# Patient Record
Sex: FEMALE | Race: NATIVE HAWAIIAN OR OTHER PACIFIC ISLANDER | NOT HISPANIC OR LATINO | URBAN - METROPOLITAN AREA
[De-identification: names, ages, dates, MRNs, and addresses within clinical notes are randomized per-mention and may not be internally consistent; named-entity substitution may affect disease eponyms.]

---

## 2017-04-17 ENCOUNTER — INPATIENT (INPATIENT)
Facility: HOSPITAL | Age: 32
LOS: 2 days | Discharge: HOME | End: 2017-04-20
Attending: OBSTETRICS & GYNECOLOGY | Admitting: OBSTETRICS & GYNECOLOGY
Payer: COMMERCIAL

## 2017-04-17 VITALS — HEIGHT: 64 IN | WEIGHT: 242.51 LBS

## 2017-04-17 LAB
BASOPHILS NFR BLD AUTO: 0.2 % — SIGNIFICANT CHANGE UP (ref 0–2)
BLD GP AB SCN SERPL QL: NEGATIVE — SIGNIFICANT CHANGE UP
EOSINOPHIL NFR BLD AUTO: 1.5 % — SIGNIFICANT CHANGE UP (ref 0–6)
HCT VFR BLD CALC: 39.7 % — SIGNIFICANT CHANGE UP (ref 34.5–45)
HGB BLD-MCNC: 13.5 G/DL — SIGNIFICANT CHANGE UP (ref 11.5–15.5)
LYMPHOCYTES # BLD AUTO: 15.8 % — SIGNIFICANT CHANGE UP (ref 13–44)
MCHC RBC-ENTMCNC: 30.5 PG — SIGNIFICANT CHANGE UP (ref 27–34)
MCHC RBC-ENTMCNC: 34 G/DL — SIGNIFICANT CHANGE UP (ref 32–36)
MCV RBC AUTO: 89.8 FL — SIGNIFICANT CHANGE UP (ref 80–100)
MONOCYTES NFR BLD AUTO: 8 % — SIGNIFICANT CHANGE UP (ref 2–14)
NEUTROPHILS NFR BLD AUTO: 74.5 % — SIGNIFICANT CHANGE UP (ref 43–77)
PLATELET # BLD AUTO: 203 K/UL — SIGNIFICANT CHANGE UP (ref 150–400)
RBC # BLD: 4.42 M/UL — SIGNIFICANT CHANGE UP (ref 3.8–5.2)
RBC # FLD: 13.2 % — SIGNIFICANT CHANGE UP (ref 10.3–16.9)
RH IG SCN BLD-IMP: POSITIVE — SIGNIFICANT CHANGE UP
RH IG SCN BLD-IMP: POSITIVE — SIGNIFICANT CHANGE UP
T PALLIDUM AB TITR SER: NEGATIVE — SIGNIFICANT CHANGE UP
WBC # BLD: 13 K/UL — HIGH (ref 3.8–10.5)
WBC # FLD AUTO: 13 K/UL — HIGH (ref 3.8–10.5)

## 2017-04-17 RX ORDER — FERROUS SULFATE 325(65) MG
325 TABLET ORAL DAILY
Qty: 0 | Refills: 0 | Status: DISCONTINUED | OUTPATIENT
Start: 2017-04-17 | End: 2017-04-20

## 2017-04-17 RX ORDER — SODIUM CHLORIDE 9 MG/ML
500 INJECTION, SOLUTION INTRAVENOUS ONCE
Qty: 0 | Refills: 0 | Status: DISCONTINUED | OUTPATIENT
Start: 2017-04-17 | End: 2017-04-20

## 2017-04-17 RX ORDER — SODIUM CHLORIDE 9 MG/ML
1000 INJECTION, SOLUTION INTRAVENOUS
Qty: 0 | Refills: 0 | Status: DISCONTINUED | OUTPATIENT
Start: 2017-04-17 | End: 2017-04-20

## 2017-04-17 RX ORDER — TETANUS TOXOID, REDUCED DIPHTHERIA TOXOID AND ACELLULAR PERTUSSIS VACCINE, ADSORBED 5; 2.5; 8; 8; 2.5 [IU]/.5ML; [IU]/.5ML; UG/.5ML; UG/.5ML; UG/.5ML
0.5 SUSPENSION INTRAMUSCULAR ONCE
Qty: 0 | Refills: 0 | Status: DISCONTINUED | OUTPATIENT
Start: 2017-04-17 | End: 2017-04-20

## 2017-04-17 RX ORDER — DIPHENHYDRAMINE HCL 50 MG
25 CAPSULE ORAL EVERY 6 HOURS
Qty: 0 | Refills: 0 | Status: DISCONTINUED | OUTPATIENT
Start: 2017-04-17 | End: 2017-04-20

## 2017-04-17 RX ORDER — OXYTOCIN 10 UNIT/ML
41.67 VIAL (ML) INJECTION
Qty: 20 | Refills: 0 | Status: DISCONTINUED | OUTPATIENT
Start: 2017-04-17 | End: 2017-04-20

## 2017-04-17 RX ORDER — HEPARIN SODIUM 5000 [USP'U]/ML
5000 INJECTION INTRAVENOUS; SUBCUTANEOUS EVERY 12 HOURS
Qty: 0 | Refills: 0 | Status: DISCONTINUED | OUTPATIENT
Start: 2017-04-18 | End: 2017-04-20

## 2017-04-17 RX ORDER — DOCUSATE SODIUM 100 MG
100 CAPSULE ORAL
Qty: 0 | Refills: 0 | Status: DISCONTINUED | OUTPATIENT
Start: 2017-04-17 | End: 2017-04-20

## 2017-04-17 RX ORDER — ONDANSETRON 8 MG/1
4 TABLET, FILM COATED ORAL EVERY 6 HOURS
Qty: 0 | Refills: 0 | Status: DISCONTINUED | OUTPATIENT
Start: 2017-04-17 | End: 2017-04-20

## 2017-04-17 RX ORDER — ACETAMINOPHEN 500 MG
650 TABLET ORAL EVERY 6 HOURS
Qty: 0 | Refills: 0 | Status: DISCONTINUED | OUTPATIENT
Start: 2017-04-17 | End: 2017-04-17

## 2017-04-17 RX ORDER — IBUPROFEN 200 MG
600 TABLET ORAL EVERY 6 HOURS
Qty: 0 | Refills: 0 | Status: DISCONTINUED | OUTPATIENT
Start: 2017-04-17 | End: 2017-04-20

## 2017-04-17 RX ORDER — IBUPROFEN 200 MG
600 TABLET ORAL EVERY 6 HOURS
Qty: 0 | Refills: 0 | Status: DISCONTINUED | OUTPATIENT
Start: 2017-04-17 | End: 2017-04-17

## 2017-04-17 RX ORDER — GLYCERIN ADULT
1 SUPPOSITORY, RECTAL RECTAL AT BEDTIME
Qty: 0 | Refills: 0 | Status: DISCONTINUED | OUTPATIENT
Start: 2017-04-17 | End: 2017-04-20

## 2017-04-17 RX ORDER — DIPHENHYDRAMINE HCL 50 MG
12.5 CAPSULE ORAL EVERY 4 HOURS
Qty: 0 | Refills: 0 | Status: DISCONTINUED | OUTPATIENT
Start: 2017-04-17 | End: 2017-04-20

## 2017-04-17 RX ORDER — IBUPROFEN 200 MG
600 TABLET ORAL ONCE
Qty: 0 | Refills: 0 | Status: DISCONTINUED | OUTPATIENT
Start: 2017-04-17 | End: 2017-04-20

## 2017-04-17 RX ORDER — METOCLOPRAMIDE HCL 10 MG
10 TABLET ORAL ONCE
Qty: 0 | Refills: 0 | Status: DISCONTINUED | OUTPATIENT
Start: 2017-04-17 | End: 2017-04-17

## 2017-04-17 RX ORDER — ACETAMINOPHEN 500 MG
650 TABLET ORAL EVERY 6 HOURS
Qty: 0 | Refills: 0 | Status: DISCONTINUED | OUTPATIENT
Start: 2017-04-17 | End: 2017-04-20

## 2017-04-17 RX ORDER — SODIUM CHLORIDE 9 MG/ML
1000 INJECTION, SOLUTION INTRAVENOUS
Qty: 0 | Refills: 0 | Status: DISCONTINUED | OUTPATIENT
Start: 2017-04-17 | End: 2017-04-17

## 2017-04-17 RX ORDER — CEFAZOLIN SODIUM 1 G
2000 VIAL (EA) INJECTION ONCE
Qty: 0 | Refills: 0 | Status: COMPLETED | OUTPATIENT
Start: 2017-04-17 | End: 2017-04-17

## 2017-04-17 RX ORDER — SODIUM CHLORIDE 9 MG/ML
1000 INJECTION, SOLUTION INTRAVENOUS ONCE
Qty: 0 | Refills: 0 | Status: COMPLETED | OUTPATIENT
Start: 2017-04-17 | End: 2017-04-17

## 2017-04-17 RX ORDER — NALOXONE HYDROCHLORIDE 4 MG/.1ML
0.1 SPRAY NASAL
Qty: 0 | Refills: 0 | Status: DISCONTINUED | OUTPATIENT
Start: 2017-04-17 | End: 2017-04-20

## 2017-04-17 RX ORDER — LANOLIN
1 OINTMENT (GRAM) TOPICAL
Qty: 0 | Refills: 0 | Status: DISCONTINUED | OUTPATIENT
Start: 2017-04-17 | End: 2017-04-20

## 2017-04-17 RX ORDER — OXYTOCIN 10 UNIT/ML
333.33 VIAL (ML) INJECTION
Qty: 20 | Refills: 0 | Status: DISCONTINUED | OUTPATIENT
Start: 2017-04-17 | End: 2017-04-20

## 2017-04-17 RX ORDER — CITRIC ACID/SODIUM CITRATE 300-500 MG
30 SOLUTION, ORAL ORAL ONCE
Qty: 0 | Refills: 0 | Status: COMPLETED | OUTPATIENT
Start: 2017-04-17 | End: 2017-04-17

## 2017-04-17 RX ORDER — SIMETHICONE 80 MG/1
80 TABLET, CHEWABLE ORAL EVERY 4 HOURS
Qty: 0 | Refills: 0 | Status: DISCONTINUED | OUTPATIENT
Start: 2017-04-17 | End: 2017-04-20

## 2017-04-17 RX ADMIN — Medication 125 MILLIUNIT(S)/MIN: at 11:24

## 2017-04-17 RX ADMIN — Medication 125 MILLIUNIT(S)/MIN: at 10:59

## 2017-04-17 RX ADMIN — SODIUM CHLORIDE 125 MILLILITER(S): 9 INJECTION, SOLUTION INTRAVENOUS at 08:50

## 2017-04-17 RX ADMIN — ONDANSETRON 4 MILLIGRAM(S): 8 TABLET, FILM COATED ORAL at 15:35

## 2017-04-17 RX ADMIN — Medication 100 MILLIGRAM(S): at 08:51

## 2017-04-17 RX ADMIN — Medication 1000 MILLIUNIT(S)/MIN: at 10:57

## 2017-04-17 RX ADMIN — SODIUM CHLORIDE 125 MILLILITER(S): 9 INJECTION, SOLUTION INTRAVENOUS at 06:45

## 2017-04-17 RX ADMIN — Medication 30 MILLILITER(S): at 08:50

## 2017-04-17 RX ADMIN — SODIUM CHLORIDE 2000 MILLILITER(S): 9 INJECTION, SOLUTION INTRAVENOUS at 06:05

## 2017-04-18 RX ADMIN — Medication 600 MILLIGRAM(S): at 06:30

## 2017-04-18 RX ADMIN — SODIUM CHLORIDE 125 MILLILITER(S): 9 INJECTION, SOLUTION INTRAVENOUS at 03:06

## 2017-04-18 RX ADMIN — Medication 600 MILLIGRAM(S): at 13:30

## 2017-04-18 RX ADMIN — Medication 600 MILLIGRAM(S): at 05:42

## 2017-04-18 RX ADMIN — HEPARIN SODIUM 5000 UNIT(S): 5000 INJECTION INTRAVENOUS; SUBCUTANEOUS at 05:42

## 2017-04-18 RX ADMIN — Medication 600 MILLIGRAM(S): at 12:30

## 2017-04-18 RX ADMIN — Medication 1 TABLET(S): at 12:31

## 2017-04-18 RX ADMIN — HEPARIN SODIUM 5000 UNIT(S): 5000 INJECTION INTRAVENOUS; SUBCUTANEOUS at 18:41

## 2017-04-18 RX ADMIN — Medication 600 MILLIGRAM(S): at 19:29

## 2017-04-18 RX ADMIN — Medication 325 MILLIGRAM(S): at 12:30

## 2017-04-18 RX ADMIN — Medication 600 MILLIGRAM(S): at 18:40

## 2017-04-18 NOTE — PROGRESS NOTE ADULT - ASSESSMENT
A/P32y s/p c/s, POD # 1 ,stable  - f/u postop cbc  Diet: Reg  Pain: OPM  IV fluid: Saline lock  OOB/SCDs/IS  Continue to monitor  Anticipate discharge POD #3 or #4

## 2017-04-19 LAB
HCT VFR BLD CALC: 32.8 % — LOW (ref 34.5–45)
HGB BLD-MCNC: 10.9 G/DL — LOW (ref 11.5–15.5)
MCHC RBC-ENTMCNC: 30.4 PG — SIGNIFICANT CHANGE UP (ref 27–34)
MCHC RBC-ENTMCNC: 33.2 G/DL — SIGNIFICANT CHANGE UP (ref 32–36)
MCV RBC AUTO: 91.6 FL — SIGNIFICANT CHANGE UP (ref 80–100)
PLATELET # BLD AUTO: 178 K/UL — SIGNIFICANT CHANGE UP (ref 150–400)
RBC # BLD: 3.58 M/UL — LOW (ref 3.8–5.2)
RBC # FLD: 13.6 % — SIGNIFICANT CHANGE UP (ref 10.3–16.9)
WBC # BLD: 13 K/UL — HIGH (ref 3.8–10.5)
WBC # FLD AUTO: 13 K/UL — HIGH (ref 3.8–10.5)

## 2017-04-19 RX ADMIN — Medication 600 MILLIGRAM(S): at 19:42

## 2017-04-19 RX ADMIN — Medication 100 MILLIGRAM(S): at 06:00

## 2017-04-19 RX ADMIN — Medication 600 MILLIGRAM(S): at 06:30

## 2017-04-19 RX ADMIN — Medication 325 MILLIGRAM(S): at 13:10

## 2017-04-19 RX ADMIN — Medication 600 MILLIGRAM(S): at 00:33

## 2017-04-19 RX ADMIN — Medication 600 MILLIGRAM(S): at 14:00

## 2017-04-19 RX ADMIN — HEPARIN SODIUM 5000 UNIT(S): 5000 INJECTION INTRAVENOUS; SUBCUTANEOUS at 18:57

## 2017-04-19 RX ADMIN — Medication 600 MILLIGRAM(S): at 06:00

## 2017-04-19 RX ADMIN — HEPARIN SODIUM 5000 UNIT(S): 5000 INJECTION INTRAVENOUS; SUBCUTANEOUS at 06:00

## 2017-04-19 RX ADMIN — Medication 600 MILLIGRAM(S): at 13:10

## 2017-04-19 RX ADMIN — Medication 1 TABLET(S): at 13:10

## 2017-04-19 RX ADMIN — Medication 600 MILLIGRAM(S): at 01:00

## 2017-04-19 RX ADMIN — Medication 600 MILLIGRAM(S): at 18:54

## 2017-04-19 NOTE — DISCHARGE NOTE OB - CARE PROVIDER_API CALL
Shirlene Lancaster), Obstetrics and Gynecology  [address] [place] [address]  Phone: [phone]  Fax: [phone]

## 2017-04-19 NOTE — PROGRESS NOTE ADULT - ASSESSMENT
A/P32y s/p c/s, POD # 2 ,stable  - f/u postop cbc  Diet: Reg  Pain: OPM  IV fluid: Saline lock  OOB/SCDs/IS  Continue to monitor  Anticipate discharge POD #3 or #4

## 2017-04-19 NOTE — DISCHARGE NOTE OB - CARE PLAN
Principal Discharge DX:	40 weeks gestation of pregnancy  Goal:	follow up 2 weeks  Instructions for follow-up, activity and diet:	follow up 2 weeks, pelvic rest 6 weeks

## 2017-04-19 NOTE — DISCHARGE NOTE OB - PATIENT PORTAL LINK FT
“You can access the [facility] Patient Portal, offered by [facility], by registering with the following website: [email or web address]”

## 2017-04-20 VITALS
TEMPERATURE: 98 F | OXYGEN SATURATION: 96 % | DIASTOLIC BLOOD PRESSURE: 73 MMHG | SYSTOLIC BLOOD PRESSURE: 114 MMHG | HEART RATE: 67 BPM | RESPIRATION RATE: 16 BRPM

## 2017-04-20 PROCEDURE — 36415 COLL VENOUS BLD VENIPUNCTURE: CPT

## 2017-04-20 PROCEDURE — 86901 BLOOD TYPING SEROLOGIC RH(D): CPT

## 2017-04-20 PROCEDURE — 85027 COMPLETE CBC AUTOMATED: CPT

## 2017-04-20 PROCEDURE — 85025 COMPLETE CBC W/AUTO DIFF WBC: CPT

## 2017-04-20 PROCEDURE — 86780 TREPONEMA PALLIDUM: CPT

## 2017-04-20 PROCEDURE — 86850 RBC ANTIBODY SCREEN: CPT

## 2017-04-20 PROCEDURE — C1765: CPT

## 2017-04-20 PROCEDURE — 86900 BLOOD TYPING SEROLOGIC ABO: CPT

## 2017-04-20 RX ADMIN — Medication 1 TABLET(S): at 13:04

## 2017-04-20 RX ADMIN — Medication 600 MILLIGRAM(S): at 02:20

## 2017-04-20 RX ADMIN — Medication 600 MILLIGRAM(S): at 08:05

## 2017-04-20 RX ADMIN — HEPARIN SODIUM 5000 UNIT(S): 5000 INJECTION INTRAVENOUS; SUBCUTANEOUS at 06:17

## 2017-04-20 RX ADMIN — Medication 100 MILLIGRAM(S): at 13:03

## 2017-04-20 RX ADMIN — Medication 325 MILLIGRAM(S): at 13:05

## 2017-04-20 RX ADMIN — Medication 600 MILLIGRAM(S): at 13:53

## 2017-04-20 RX ADMIN — Medication 650 MILLIGRAM(S): at 08:59

## 2017-04-20 RX ADMIN — Medication 600 MILLIGRAM(S): at 07:06

## 2017-04-20 RX ADMIN — Medication 600 MILLIGRAM(S): at 01:23

## 2017-04-20 RX ADMIN — Medication 600 MILLIGRAM(S): at 13:04

## 2017-04-20 NOTE — PROGRESS NOTE ADULT - SUBJECTIVE AND OBJECTIVE BOX
Patient seen and evaluated. No complaints.  Vital Signs Last 24 Hrs  T(C): 36.9, Max: 37.2 (04-19 @ 14:44)  T(F): 98.4, Max: 99 (04-19 @ 14:44)  HR: 67 (64 - 76)  BP: 114/73 (114/73 - 120/78)  BP(mean): --  RR: 16 (16 - 18)  SpO2: 96% (96% - 98%)    Uterus firm, non tender, incision clean/dry/intact  Ext no calf tenderness    Plan POD 3   Regular diet  ambulation encouraged  DC home
Patient evaluated at bedside.   She reports pain is well controlled with OPM.  She has been ambulating without assistance, voiding spontaneously, passing gas, tolerating regular diet and is breastfeeding.    She denies HA, dizziness, CP, palpitations, SOB, n/v, or heavy vaginal bleeding.    Physical Exam:  vss  Gen: NAD  Abd: + BS, soft, nontender, nondistended, no rebound or guarding  Incision clean, dry and intact  uterus firm at midline,   fb below umbilicus  : lochia WNL  Extremities: no swelling or calf tenderness    pod1 stable
Patient evaluated at bedside.   She reports pain is well controlled with opm  She denies headache, dizziness, chest pain, palpitations, shortness of breathe, nausea, vomiting or heavy vaginal bleeding.  She has been ambulating and voiding. She is passing gas yet and is tolerating reg diet Pt is breastfeeding.    Physical Exam:  Vital Signs Last 24 Hrs  T(C): 36.7, Max: 36.7 (04-18 @ 10:00)  T(F): 98, Max: 98.1 (04-18 @ 10:00)  HR: 67 (67 - 76)  BP: 110/72 (110/65 - 117/59)  BP(mean): --  RR: 14 (14 - 18)  SpO2: 97% (97% - 98%)    GA: NAD, A+0 x 3  CV: RRR  Pulm: CTAB  Breasts: soft, nontender, no palpable masses  Abd: + BS, soft, nontender, nondistended, no rebound or guarding, incision clean, dry and intact, uterus firm at midline, 1 fb below umbilicus  : lochia WNL  Extremities: no swelling or calf tenderness, reflexes +2 bilaterally                            13.5   13.0  )-----------( 203      ( 17 Apr 2017 06:36 )             39.7
Patient evaluated at bedside.   She reports pain is well controlled with opm  She denies headache, dizziness, chest pain, palpitations, shortness of breathe, nausea, vomiting or heavy vaginal bleeding.  She has been ambulating and voiding. She is still passing gas and is tolerating reg diet Pt is breastfeeding.    Physical Exam:  Vital Signs Last 24 Hrs  T(C): 36.2, Max: 37.2 (04-19 @ 14:44)  T(F): 97.2, Max: 99 (04-19 @ 14:44)  HR: 70 (64 - 76)  BP: 115/74 (115/74 - 120/78)  BP(mean): --  RR: 18 (16 - 18)  SpO2: 96% (96% - 98%)    GA: NAD, A+0 x 3  Abd: + BS, soft, nontender, nondistended, no rebound or guarding, incision clean, dry and intact, uterus firm at midline, 1 fb below umbilicus  : lochia WNL  Extremities: no swelling or calf tenderness    Complete Blood Count (04.19.17 @ 11:19)    WBC Count: 13.0 K/uL    RBC Count: 3.58 M/uL    Hemoglobin: 10.9 g/dL    Hematocrit: 32.8 %    Mean Cell Volume: 91.6 fL    Mean Cell Hemoglobin: 30.4 pg    Mean Cell Hemoglobin Conc: 33.2 g/dL    Red Cell Distrib Width: 13.6 %    Platelet Count - Automated: 178 K/uL                        13.5   13.0  )-----------( 203      ( 17 Apr 2017 06:36 )             39.7
Patient evaluated at bedside.   She reports pain is well controlled with opm  She denies headache, dizziness, chest pain, palpitations, shortness of breathe, nausea, vomiting or heavy vaginal bleeding.  She has not been ambulating or voiding due to having SCDs and espinal. She is not passing gas yet but is tolerating regular diet and is breastfeeding.    Physical Exam:  Vital Signs Last 24 Hrs  T(C): 36.8, Max: 36.8 (04-18 @ 02:23)  T(F): 98.3, Max: 98.3 (04-18 @ 02:23)  HR: 77 (60 - 82)  BP: 123/74 (91/53 - 123/74)  BP(mean): --  RR: 18 (16 - 21)  SpO2: 98% (95% - 100%)    GA: NAD, A+0 x 3  CV: RRR  Pulm: CTAB  Breasts: soft, nontender, no palpable masses  Abd: + BS, soft, nontender, nondistended, no rebound or guarding, incision clean, dry and intact, uterus firm at midline, 1 fb below umbilicus  : lochia WNL  Espinal: in situ, voiding, clear  Extremities: no swelling or calf tenderness, reflexes +2 bilaterally                            13.5   13.0  )-----------( 203      ( 17 Apr 2017 06:36 )             39.7

## 2017-04-20 NOTE — PROGRESS NOTE ADULT - ASSESSMENT
A/P32y s/p c/s, POD # 3 ,stable  Diet: Reg  Pain: OPM  IV fluid: Saline lock  OOB/SCDs/IS  Continue to monitor  Anticipate discharge POD #3 or #4

## 2017-04-24 DIAGNOSIS — Z3A.40 40 WEEKS GESTATION OF PREGNANCY: ICD-10-CM

## 2019-03-29 NOTE — PATIENT PROFILE OB - LIVING CHILDREN, OB PROFILE
0 Bipolar affective disorder    Depression    Hearing impaired    Hearing loss sensory, bilateral    Polyp  of uterus

## 2021-05-14 NOTE — PATIENT PROFILE OB - AS LABOR RUPTURE OF MEMBRANES YN
Current Outpatient Medications:   •  cetirizine 10 MG Oral Tab, Take 1 tablet (10 mg total) by mouth daily. , Disp: 30 tablet, Rfl: 2
LOV: 12/18/20  Last refill: 2/17/21    Dr. Meenakshi Ortiz review and sign pended order for cetrizine if agreeable.
yes